# Patient Record
Sex: FEMALE | Race: WHITE | NOT HISPANIC OR LATINO
[De-identification: names, ages, dates, MRNs, and addresses within clinical notes are randomized per-mention and may not be internally consistent; named-entity substitution may affect disease eponyms.]

---

## 2017-01-17 ENCOUNTER — APPOINTMENT (OUTPATIENT)
Dept: CARDIOLOGY | Facility: CLINIC | Age: 52
End: 2017-01-17

## 2017-01-17 VITALS
SYSTOLIC BLOOD PRESSURE: 128 MMHG | RESPIRATION RATE: 16 BRPM | WEIGHT: 220 LBS | DIASTOLIC BLOOD PRESSURE: 82 MMHG | HEART RATE: 80 BPM | HEIGHT: 63 IN | BODY MASS INDEX: 38.98 KG/M2

## 2017-01-17 DIAGNOSIS — Z82.49 FAMILY HISTORY OF ISCHEMIC HEART DISEASE AND OTHER DISEASES OF THE CIRCULATORY SYSTEM: ICD-10-CM

## 2017-01-17 DIAGNOSIS — Z86.2 PERSONAL HISTORY OF DISEASES OF THE BLOOD AND BLOOD-FORMING ORGANS AND CERTAIN DISORDERS INVOLVING THE IMMUNE MECHANISM: ICD-10-CM

## 2017-01-17 RX ORDER — OMEPRAZOLE 40 MG/1
40 CAPSULE, DELAYED RELEASE ORAL DAILY
Qty: 30 | Refills: 5 | Status: ACTIVE | COMMUNITY
Start: 2017-01-17

## 2017-02-11 ENCOUNTER — APPOINTMENT (OUTPATIENT)
Dept: CARDIOLOGY | Facility: CLINIC | Age: 52
End: 2017-02-11

## 2017-02-13 ENCOUNTER — APPOINTMENT (OUTPATIENT)
Dept: CARDIOLOGY | Facility: CLINIC | Age: 52
End: 2017-02-13

## 2017-09-18 ENCOUNTER — RESULT REVIEW (OUTPATIENT)
Age: 52
End: 2017-09-18

## 2020-08-28 ENCOUNTER — APPOINTMENT (OUTPATIENT)
Dept: INTERNAL MEDICINE | Facility: CLINIC | Age: 55
End: 2020-08-28

## 2020-09-02 ENCOUNTER — APPOINTMENT (OUTPATIENT)
Dept: PULMONOLOGY | Facility: CLINIC | Age: 55
End: 2020-09-02

## 2020-09-30 ENCOUNTER — LABORATORY RESULT (OUTPATIENT)
Age: 55
End: 2020-09-30

## 2020-09-30 ENCOUNTER — APPOINTMENT (OUTPATIENT)
Dept: PULMONOLOGY | Facility: CLINIC | Age: 55
End: 2020-09-30
Payer: MEDICAID

## 2020-09-30 VITALS
BODY MASS INDEX: 41.11 KG/M2 | OXYGEN SATURATION: 97 % | HEIGHT: 63 IN | DIASTOLIC BLOOD PRESSURE: 88 MMHG | TEMPERATURE: 98.4 F | WEIGHT: 232 LBS | HEART RATE: 85 BPM | SYSTOLIC BLOOD PRESSURE: 142 MMHG

## 2020-09-30 DIAGNOSIS — F17.200 NICOTINE DEPENDENCE, UNSPECIFIED, UNCOMPLICATED: ICD-10-CM

## 2020-09-30 PROCEDURE — 99406 BEHAV CHNG SMOKING 3-10 MIN: CPT

## 2020-09-30 PROCEDURE — 36415 COLL VENOUS BLD VENIPUNCTURE: CPT

## 2020-09-30 PROCEDURE — 99204 OFFICE O/P NEW MOD 45 MIN: CPT | Mod: 25

## 2020-09-30 NOTE — COUNSELING
[Risk of tobacco use and health benefits of smoking cessation discussed] : Risk of tobacco use and health benefits of smoking cessation discussed [Use of nicotine replacement therapies and other medications discussed] : Use of nicotine replacement therapies and other medications discussed [Willing to Quit Smoking] : Willing to quit smoking [Tobacco Use Cessation Intermediate Greater Than 3 Minutes Up to 10 Minutes] : Tobacco Use Cessation Intermediate Greater Than 3 Minutes Up to 10 Minutes [FreeTextEntry2] : Pt will get otc nicotine patch 14 mg and knows she should not smoke while patch used [FreeTextEntry1] : 7

## 2020-09-30 NOTE — ASSESSMENT
[FreeTextEntry1] : \par \par VISIT DATE ISSUES COMPLAINTS EVENTS PERTINENT ROS AND PE\par \par INITIAL VISIT\par 9/30/2020 Wants to start seeing Pulm for dyspnea current smoker has gerd Saw Dr Og Wang recently Used to see Dr Grey 3 y ago but insurnace changed and not accepted in their office Has tried chantix but got nightmares  Has fam ho cad at young age in her sister She stated smoking age 9 1 ppd now 1/2 ppd \par \par \par MEDICATIONS\par ----------------------\par 9/30/2020 \par Omeprazole 40 (9/30/2020) \par FA 1 (9/30/2020) \par \par \par ALLERGY.    9/30/2020 nka \par                                          \par CURRENT SMOKER.   9/30/2020 1/2 ppd \par PAST SMOKING HX.       9/30/2020 started age 9 1 ppd \par \par DYSPNEA DATE/MMRC.   9/30/2020 1 \par COUGH.    .9/30/2020 No Gets bronchitis every year                         \par PHLEGM.  9/30/2020 scant \par     \par CLUBBING.     9/30/2020 no               .\par WHEEZE.  9/30/2020 no \par JUGULAR VENOUS DISTENTION.   9/30/2020 no                           \par PEDAL EDEMA.    9/30/2020 no   \par \par \par                                             \par BP.         \par 9/30/2020 140/80 \par HR.   \par 9/30/2020 85\par                                                    \par WT.        \par 9/30/2020 232 lb                      \par BMI.   \par 9/30/2020 41 \par    \par PULSE OX.\par 9/30/2020 ra rest 97%\par \par \par \par MONITORING. \par \par DYSPNEA. \par DATE            MMRC (M)\par 9/30/2020    1\par \par    \par DATE                 COMMENTS   \par 9/30/2020        1 I get short of breath when hurrying on level ground or walking up a slight hill \par \par \par \par \par ALLERGY. 9/30/2020 nka\par HABITS (ETOH/DRUGS).   9/30/2020 Former drug abuse crack cocaine In recovery sionce 1996\par HABITS (SMOKING). 9/30/2020 1 ppd since age 9 now 1/2 ppd \par FAMILY.   9/30/2020 Sister cad young age No cancer in family No asthma \par BIRTHPLACE.9/30/2020 [place] \par IN [place] SINCE. \par TRAVEL.     9/30/2020 No             \par IMMUNIZATION.   9/30/2020 Usually does not take flu shot                        \par PETS.     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 9/30/2020 No \par \par     \par HOME OXYGEN..  9/30/2020 No       \par HOME NIV..     9/30/2020 No \par HOME NEBUL. 9/30/2020 No \par SPACER. 9/30/2020 No \par \par \par _____________________________\par PROBLEM LIST\par _____________________________  \par \par DYSPNEA\par 9/30/2020 SOB on minor exertion and hears herself weezing Saw DR Grey 3 y ago but he does not accept her insurance \par \par CURRENT SMOKER\par 9/30/2020 Smokes 1 ppd \par 9/30/2020 Tried Chatix in past but did not like it Had nightmares\par \par NEED FOR LUNG CANCER SCREENING\par \par CAD\par Family HO premature CAD (sister)\par \par OBESITY\par 9/30/2020 She knows that she needs to stop smoking \par 9/30/2020 232 lb BMI 41 \par \par GERD\par \par ______________________________________\par ASSESSMENT/ RECOMMENDATIONS (A/R) \par PROBLEM\par _______________________________________\par \par DYSPNEA\par 9/30/2020 has ho smoking so could have copd \par 9/30/2020 Check labs incl aec ige bnp cbc sma7 d-dimr COVID \par 9/30/2020 Check covid\par 9/30/2020 Check pfts \par 9/30/2020 Check cxr\par 9/30/2020 Check v duplex \par GERD\par 9/30/2020 is on omeprazole \par OBESITY \par 9/30/2020 Advised wt loss\par SMOKER\par 9/30/2020 Counseled to stop \par 9/30/2020 Start nicotine patch 14 mg \par \par \par \par \par \par \par \par \par CAITLIN BURTON 1965   NKA Dr Dasilva 54 f \par \par

## 2020-09-30 NOTE — PHYSICAL EXAM
[No Acute Distress] : no acute distress [Normal Oropharynx] : normal oropharynx [Normal Appearance] : normal appearance [No Neck Mass] : no neck mass [Normal Rate/Rhythm] : normal rate/rhythm [Normal S1, S2] : normal s1, s2 [No Murmurs] : no murmurs [No Resp Distress] : no resp distress [Clear to Auscultation Bilaterally] : clear to auscultation bilaterally [No Abnormalities] : no abnormalities [Benign] : benign [Normal Gait] : normal gait [No Clubbing] : no clubbing [No Cyanosis] : no cyanosis [No Edema] : no edema [FROM] : FROM [Normal Color/ Pigmentation] : normal color/ pigmentation [No Focal Deficits] : no focal deficits [Oriented x3] : oriented x3 [Normal Affect] : normal affect [TextBox_2] : obese

## 2020-09-30 NOTE — CONSULT LETTER
[Dear  ___] : Dear  [unfilled], [Consult Letter:] : I had the pleasure of evaluating your patient, [unfilled]. [Please see my note below.] : Please see my note below. [Consult Closing:] : Thank you very much for allowing me to participate in the care of this patient.  If you have any questions, please do not hesitate to contact me. [Sincerely,] : Sincerely, [FreeTextEntry3] : Yours truly,\par \par Lenin Meneses MD\par

## 2020-10-06 LAB
A1AT SERPL-MCNC: 144 MG/DL
ALBUMIN SERPL ELPH-MCNC: 4.2 G/DL
ALP BLD-CCNC: 100 U/L
ALT SERPL-CCNC: 30 U/L
ANION GAP SERPL CALC-SCNC: 16 MMOL/L
AST SERPL-CCNC: 22 U/L
BASOPHILS # BLD AUTO: 0.16 K/UL
BASOPHILS NFR BLD AUTO: 1.7 %
BILIRUB SERPL-MCNC: 0.8 MG/DL
BUN SERPL-MCNC: 12 MG/DL
CALCIUM SERPL-MCNC: 9.2 MG/DL
CHLORIDE SERPL-SCNC: 104 MMOL/L
CO2 SERPL-SCNC: 22 MMOL/L
CREAT SERPL-MCNC: 0.66 MG/DL
DEPRECATED D DIMER PPP IA-ACNC: <150 NG/ML DDU
EOSINOPHIL # BLD AUTO: 0.08 K/UL
EOSINOPHIL # BLD MANUAL: 180 /UL
EOSINOPHIL NFR BLD AUTO: 0.9 %
GLUCOSE SERPL-MCNC: 89 MG/DL
HCT VFR BLD CALC: 39.4 %
HGB BLD-MCNC: 11.6 G/DL
LYMPHOCYTES # BLD AUTO: 3.95 K/UL
LYMPHOCYTES NFR BLD AUTO: 42.1 %
MAN DIFF?: NORMAL
MCHC RBC-ENTMCNC: 19.5 PG
MCHC RBC-ENTMCNC: 29.4 GM/DL
MCV RBC AUTO: 66.1 FL
MONOCYTES # BLD AUTO: 0.24 K/UL
MONOCYTES NFR BLD AUTO: 2.6 %
NEUTROPHILS # BLD AUTO: 4.77 K/UL
NEUTROPHILS NFR BLD AUTO: 50.9 %
NT-PROBNP SERPL-MCNC: 137 PG/ML
PLATELET # BLD AUTO: 192 K/UL
POTASSIUM SERPL-SCNC: 4.3 MMOL/L
PROT SERPL-MCNC: 6.6 G/DL
RBC # BLD: 5.96 M/UL
RBC # FLD: 17.9 %
SARS-COV-2 IGG SERPL IA-ACNC: <0.1 INDEX
SARS-COV-2 IGG SERPL QL IA: NEGATIVE
SODIUM SERPL-SCNC: 141 MMOL/L
TOTAL IGE SMQN RAST: 9 KU/L
WBC # FLD AUTO: 9.38 K/UL

## 2020-10-13 ENCOUNTER — OUTPATIENT (OUTPATIENT)
Dept: OUTPATIENT SERVICES | Facility: HOSPITAL | Age: 55
LOS: 1 days | End: 2020-10-13
Payer: MEDICAID

## 2020-10-13 ENCOUNTER — APPOINTMENT (OUTPATIENT)
Dept: ULTRASOUND IMAGING | Facility: CLINIC | Age: 55
End: 2020-10-13
Payer: MEDICAID

## 2020-10-13 ENCOUNTER — APPOINTMENT (OUTPATIENT)
Dept: RADIOLOGY | Facility: CLINIC | Age: 55
End: 2020-10-13
Payer: MEDICAID

## 2020-10-13 DIAGNOSIS — F17.210 NICOTINE DEPENDENCE, CIGARETTES, UNCOMPLICATED: ICD-10-CM

## 2020-10-13 DIAGNOSIS — Z00.8 ENCOUNTER FOR OTHER GENERAL EXAMINATION: ICD-10-CM

## 2020-10-13 DIAGNOSIS — K21.9 GASTRO-ESOPHAGEAL REFLUX DISEASE WITHOUT ESOPHAGITIS: ICD-10-CM

## 2020-10-13 PROCEDURE — 71046 X-RAY EXAM CHEST 2 VIEWS: CPT

## 2020-10-13 PROCEDURE — 71046 X-RAY EXAM CHEST 2 VIEWS: CPT | Mod: 26

## 2020-10-13 PROCEDURE — 93970 EXTREMITY STUDY: CPT | Mod: 26

## 2020-10-13 PROCEDURE — 93970 EXTREMITY STUDY: CPT

## 2020-10-14 DIAGNOSIS — Z01.818 ENCOUNTER FOR OTHER PREPROCEDURAL EXAMINATION: ICD-10-CM

## 2020-10-15 ENCOUNTER — APPOINTMENT (OUTPATIENT)
Dept: DISASTER EMERGENCY | Facility: CLINIC | Age: 55
End: 2020-10-15

## 2020-10-16 LAB — SARS-COV-2 N GENE NPH QL NAA+PROBE: NOT DETECTED

## 2020-10-19 ENCOUNTER — APPOINTMENT (OUTPATIENT)
Dept: PULMONOLOGY | Facility: CLINIC | Age: 55
End: 2020-10-19
Payer: MEDICAID

## 2020-10-19 PROCEDURE — 99072 ADDL SUPL MATRL&STAF TM PHE: CPT

## 2020-10-19 PROCEDURE — 94729 DIFFUSING CAPACITY: CPT

## 2020-10-19 PROCEDURE — 94010 BREATHING CAPACITY TEST: CPT

## 2020-10-19 PROCEDURE — 94727 GAS DIL/WSHOT DETER LNG VOL: CPT

## 2020-10-21 ENCOUNTER — APPOINTMENT (OUTPATIENT)
Dept: PULMONOLOGY | Facility: CLINIC | Age: 55
End: 2020-10-21
Payer: MEDICAID

## 2020-10-21 VITALS
TEMPERATURE: 98.2 F | DIASTOLIC BLOOD PRESSURE: 85 MMHG | BODY MASS INDEX: 40.75 KG/M2 | SYSTOLIC BLOOD PRESSURE: 131 MMHG | OXYGEN SATURATION: 98 % | HEIGHT: 63 IN | WEIGHT: 230 LBS | HEART RATE: 80 BPM | RESPIRATION RATE: 14 BRPM

## 2020-10-21 PROCEDURE — 99406 BEHAV CHNG SMOKING 3-10 MIN: CPT

## 2020-10-21 PROCEDURE — 99072 ADDL SUPL MATRL&STAF TM PHE: CPT

## 2020-10-21 PROCEDURE — 99214 OFFICE O/P EST MOD 30 MIN: CPT | Mod: 25

## 2020-10-21 NOTE — COUNSELING
[Risk of tobacco use and health benefits of smoking cessation discussed] : Risk of tobacco use and health benefits of smoking cessation discussed [Cessation strategies including cessation program discussed] : Cessation strategies including cessation program discussed [Use of nicotine replacement therapies and other medications discussed] : Use of nicotine replacement therapies and other medications discussed [Willing to Quit Smoking] : Willing to quit smoking [Tobacco Use Cessation Intermediate Greater Than 3 Minutes Up to 10 Minutes] : Tobacco Use Cessation Intermediate Greater Than 3 Minutes Up to 10 Minutes [FreeTextEntry1] : 7
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
## 2020-12-02 ENCOUNTER — APPOINTMENT (OUTPATIENT)
Dept: PULMONOLOGY | Facility: CLINIC | Age: 55
End: 2020-12-02

## 2020-12-09 ENCOUNTER — APPOINTMENT (OUTPATIENT)
Dept: PULMONOLOGY | Facility: CLINIC | Age: 55
End: 2020-12-09
Payer: MEDICAID

## 2020-12-09 VITALS
HEART RATE: 82 BPM | DIASTOLIC BLOOD PRESSURE: 75 MMHG | TEMPERATURE: 98.1 F | WEIGHT: 233 LBS | RESPIRATION RATE: 14 BRPM | BODY MASS INDEX: 41.29 KG/M2 | HEIGHT: 63 IN | OXYGEN SATURATION: 96 % | SYSTOLIC BLOOD PRESSURE: 117 MMHG

## 2020-12-09 DIAGNOSIS — Z12.2 ENCOUNTER FOR SCREENING FOR MALIGNANT NEOPLASM OF RESPIRATORY ORGANS: ICD-10-CM

## 2020-12-09 PROCEDURE — 99406 BEHAV CHNG SMOKING 3-10 MIN: CPT

## 2020-12-09 PROCEDURE — 99214 OFFICE O/P EST MOD 30 MIN: CPT | Mod: 25

## 2020-12-09 PROCEDURE — 99072 ADDL SUPL MATRL&STAF TM PHE: CPT

## 2020-12-09 NOTE — ASSESSMENT
[FreeTextEntry1] : \par \par \par \par \par _________________________________\par VISIT DATE ISSUES COMPLAINTS EVENTS PERTINENT ROS AND PE\par ______________________________\par \par 2020 Contines to smoke Counseled again for over 7 min risks rewards discussed \par 2020 Could not get spiriva Nurse checked Insurance will okay Incruse  which was ordered\par 2020 Agrees to ldct meets criteria  referred  Shared decision making done \par \par \par _____________________________\par PROBLEM LIST\par _____________________________ .  \par \par DYSPNEA 2020 SOB on minor exertion \par SMOKER 2020 Smokes 1 ppd \par OBESITY 2020 232 lb BMI 41 \par NEED FOR LUNG CANCER SCREENING 54 f smoker\par CAD\par FAMILY HISTORY OF PREMATURE CAD (Sister)\par GERD\par \par \par RECOMMENDATIONS. \par COPD \par 2020 Insurance does not cover spiriva Covers incruse Will order \par NEED FOR HOME OXYGEN \par 2020 RA PO 96% Does not need at this point \par SCREENING FOR LUNG CA \par 2020 Refer ct ldct \par SMOKING CESSATION \par 2020 Still smokes 1/2 ppd Counseled again for 7 min to stop Explained risks rewards \par 2020 RTC 3 w \par FU \par 2020 3 wk\par \par \par \par \par \par \par \par ___________________\par PATIENT NAME KAITLIN BURTON 1965\par PATIENT INSTRUCTIONS \par Copy was handed to patient at end of visit\par DATE OF VISIT  2020\par _______________________________\par \par 1) Come back to see Dr Meneses 3 WEEKS \par 2) Get LOW DOSE SCREENING CT CHEST AT PLACE  TELLS YOU\par 3) START INCRUSE 1 PUFF A DAY INSTEAD OF SPIRIVA \par 4) stop smoking\par \par

## 2020-12-10 ENCOUNTER — TRANSCRIPTION ENCOUNTER (OUTPATIENT)
Age: 55
End: 2020-12-10

## 2021-01-06 ENCOUNTER — APPOINTMENT (OUTPATIENT)
Dept: PULMONOLOGY | Facility: CLINIC | Age: 56
End: 2021-01-06
Payer: MEDICAID

## 2021-01-06 VITALS
OXYGEN SATURATION: 96 % | SYSTOLIC BLOOD PRESSURE: 123 MMHG | DIASTOLIC BLOOD PRESSURE: 86 MMHG | HEIGHT: 63 IN | WEIGHT: 235 LBS | HEART RATE: 79 BPM | TEMPERATURE: 97.6 F | BODY MASS INDEX: 41.64 KG/M2 | RESPIRATION RATE: 14 BRPM

## 2021-01-06 DIAGNOSIS — R06.00 DYSPNEA, UNSPECIFIED: ICD-10-CM

## 2021-01-06 DIAGNOSIS — Z23 ENCOUNTER FOR IMMUNIZATION: ICD-10-CM

## 2021-01-06 DIAGNOSIS — J44.9 CHRONIC OBSTRUCTIVE PULMONARY DISEASE, UNSPECIFIED: ICD-10-CM

## 2021-01-06 DIAGNOSIS — J84.115 RESPIRATORY BRONCHIOLITIS INTERSTITIAL LUNG DISEASE: ICD-10-CM

## 2021-01-06 PROCEDURE — 90686 IIV4 VACC NO PRSV 0.5 ML IM: CPT

## 2021-01-06 PROCEDURE — 90471 IMMUNIZATION ADMIN: CPT

## 2021-01-06 PROCEDURE — 99072 ADDL SUPL MATRL&STAF TM PHE: CPT

## 2021-01-06 PROCEDURE — 90732 PPSV23 VACC 2 YRS+ SUBQ/IM: CPT

## 2021-01-06 PROCEDURE — 99214 OFFICE O/P EST MOD 30 MIN: CPT | Mod: 25

## 2021-01-06 PROCEDURE — 90472 IMMUNIZATION ADMIN EACH ADD: CPT

## 2021-01-06 PROCEDURE — 99406 BEHAV CHNG SMOKING 3-10 MIN: CPT | Mod: 25

## 2021-01-06 NOTE — ASSESSMENT
[FreeTextEntry1] : \par \par \par ___________________\par RBILD\par ___________________\par HISTORY\par 2021 54 f Current smoker\par CT \par LDCT [facility] \par 2020 \par Findings cw RBILD No susp nodules \par A/R\par 2020 Called spoke to patient Strongly counseled to stop smoking immediately and to make sure to see me at scheduled appt\par \par 2020 Message left on patient's phone to call me so I can dw her re RBILD \par \par \par ___________________\par SMOKING CESSATION \par ___________________\par HISTORY\par 2020 Smokes 1 ppd \par 2020 Tried Chatix in past but did not like it Had nightmares\par quit 2021\par CURRENT SMOKING STATUS\par 2021 stopped smoking 2021\par 2020 Smokes 1 ppd \par PHARMACOLOGIC AIDS\par 2021 States she has tried nicotine patch chantix (gave her nightmares) \par 2021 Declines NRT Bupropion \par A/R \par 2021 Stopped smoking New Years jose\par 2021 Reinforced to not resume smoking\par 2021 Explained risks and rewards of smoking and cessation resp Spent 7 min\par \par 2020 Still smokes 1/2 ppd Counseled again for 7 min to stop Explained risks rewards \par 2020 RTC 3 w \par FU \par 2020 3 wk\par \par \par \par __________________\par ASSESSMENT/RECOMMENDATIONS. \par _____________________________\par \par FOLLOWUP. 2021 RTC 2 m \par \par DYSPNEA. 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 \par 2021 ra rest po 96% Does not require home O2 \par \par COPD . \par 2021 Has incruse feels it helps \par \par OBESITY. \par 2021 Is planning to dw PMD re bariatric surgery \par \par SMOKING CESSATION. \par 2021 Stopped smoking New Years jose\par 2021 Reinforced to not resume smoking\par 2021 Explained risks and rewards of smoking and cessation resp Spent 7 min\par \par LUNG CANCER SCREEN. \par LDCT done 2020 Recommended annual ct screening \par 2021 Explained to pt \par \par RBILD \par 2020 CT chest [facility] showed resp bronchiolitis interstitial lung disease which is caused by smoking \par 2021 Pt explained that it is very important that she does not restart smoking\par 2021 Pt agrees to not smoke Will plan HRCT in 2021 \par \par IMMUNIZATION\par 2021 Has never has pneumonia or flu shot \par 2021 refer for ppsv23 as she has chr lung disease so qualifies for shot even though she is below 65\par 2021 refer for flu shot \par ______________\par CAITLIN BURTON 1965  [phone] GILMA Dasilva 54 f \par \par \par \par \par PATIENT NAME  CAITLIN BURTON 1965\par PATIENT INSTRUCTIONS \par Copy was handed to patient at end of visit\par DATE OF VISIT  2021\par _______________________________\par \par 1) Come back to see Dr Meneses 2 M\par 2) STOP SMOKING YOU HAVE A LUNG DISEASE CALLED RBILD WHICH IS CUASED BY SMOKING AND WE WILL PLAN CT CHEST IN 2 M ONVCE YOU STOP SMOKING \par 3) I ORDERED FLU SHOT AND PNEUMONIA SHOT TODAY  PLEASE TAKE BOTH BEFORE YOU LEAVE (PPSV23)(FLUARIX)

## 2021-02-21 ENCOUNTER — TRANSCRIPTION ENCOUNTER (OUTPATIENT)
Age: 56
End: 2021-02-21

## 2021-04-14 ENCOUNTER — APPOINTMENT (OUTPATIENT)
Dept: PULMONOLOGY | Facility: CLINIC | Age: 56
End: 2021-04-14

## 2021-09-09 ENCOUNTER — NON-APPOINTMENT (OUTPATIENT)
Age: 56
End: 2021-09-09

## 2021-09-09 ENCOUNTER — APPOINTMENT (OUTPATIENT)
Dept: GASTROENTEROLOGY | Facility: CLINIC | Age: 56
End: 2021-09-09
Payer: MEDICAID

## 2021-09-09 VITALS
HEIGHT: 63 IN | SYSTOLIC BLOOD PRESSURE: 125 MMHG | BODY MASS INDEX: 40.93 KG/M2 | HEART RATE: 85 BPM | DIASTOLIC BLOOD PRESSURE: 87 MMHG | WEIGHT: 231 LBS

## 2021-09-09 DIAGNOSIS — K21.9 GASTRO-ESOPHAGEAL REFLUX DISEASE W/OUT ESOPHAGITIS: ICD-10-CM

## 2021-09-09 DIAGNOSIS — F17.210 NICOTINE DEPENDENCE, CIGARETTES, UNCOMPLICATED: ICD-10-CM

## 2021-09-09 DIAGNOSIS — Z12.11 ENCOUNTER FOR SCREENING FOR MALIGNANT NEOPLASM OF COLON: ICD-10-CM

## 2021-09-09 DIAGNOSIS — E66.9 OBESITY, UNSPECIFIED: ICD-10-CM

## 2021-09-09 PROCEDURE — 99204 OFFICE O/P NEW MOD 45 MIN: CPT

## 2021-09-09 RX ORDER — UMECLIDINIUM 62.5 UG/1
62.5 AEROSOL, POWDER ORAL DAILY
Qty: 30 | Refills: 4 | Status: DISCONTINUED | COMMUNITY
Start: 2020-12-09 | End: 2021-09-09

## 2021-09-09 RX ORDER — FOLIC ACID 1 MG/1
1 TABLET ORAL DAILY
Qty: 30 | Refills: 5 | Status: DISCONTINUED | COMMUNITY
Start: 2017-01-17 | End: 2021-09-09

## 2021-09-09 RX ORDER — TIOTROPIUM BROMIDE 18 UG/1
18 CAPSULE ORAL; RESPIRATORY (INHALATION) DAILY
Qty: 30 | Refills: 4 | Status: DISCONTINUED | COMMUNITY
Start: 2020-10-21 | End: 2021-09-09

## 2021-09-10 PROBLEM — E66.9 OBESITY: Status: ACTIVE | Noted: 2017-01-17

## 2021-09-10 PROBLEM — K21.9 GERD (GASTROESOPHAGEAL REFLUX DISEASE): Status: ACTIVE | Noted: 2017-01-17

## 2021-09-10 PROBLEM — Z12.11 ENCOUNTER FOR SCREENING COLONOSCOPY: Status: RESOLVED | Noted: 2021-09-10 | Resolved: 2021-09-24

## 2021-09-10 PROBLEM — F17.210 CIGARETTE SMOKER: Status: ACTIVE | Noted: 2020-09-30

## 2021-09-10 NOTE — ASSESSMENT
[FreeTextEntry1] : 1.  Encounter for colon cancer screening in average risk patient.  No previous colonoscopy.\par 2.  Chronic GERD, stable on PPI.  Rule out Haque's esophagus.\par 3.  Morbid obesity.\par 4.  Asthma/ COPD.\par 5.  Smoker.\par \par Recs:\par - Recent labs reviewed.\par - Patient was counseled on GERD lifestyle modifications including head of bed elevation at night, avoiding lying down 2-3 hours after eating, weight loss, smoking cessation, avoiding tight-fitting clothing, eating small, frequent meals, and minimizing potential food triggers (tomatoes/sauce, caffeine, alcohol, spicy foods, citrus foods, red meat, chocolate, mint, carbonated beverages).\par - Patient was advised on weight loss and smoking cessation.\par - Patient was advised to undergo endoscopy and colonoscopy in the hospital setting- procedure, risks, benefits, and alternatives were explained. Patient agreeable. Brochure given. [employer] prep. (She has prep from her friend Cherie Woo).

## 2021-09-10 NOTE — HISTORY OF PRESENT ILLNESS
[Heartburn] : stable heartburn [Nausea] : denies nausea [Vomiting] : denies vomiting [Diarrhea] : denies diarrhea [Constipation] : denies constipation [Yellow Skin Or Eyes (Jaundice)] : denies jaundice [Abdominal Pain] : denies abdominal pain [Abdominal Swelling] : denies abdominal swelling [Rectal Pain] : denies rectal pain [de-identified] : Stephanie presents to the office today for evaluation for colon cancer screening.  She also suffers from GERD.\par \par The patient reports a history of chronic reflux for more than 10 years and had been taking omeprazole 20 mg for many years which was increased to 40 mg BID a few years ago due to worsening reflux.  She sometimes feels as if food sits in her epigastric region.  She  moves her bowels daily and rarely sees red blood coating the stool if she passes a hard stool.  She denies any NSAIDs or FH of GI malignancies.  She has never had a colonoscopy but had a prior EGD more than 10 years ago.  She has gained weight over the past few years.

## 2021-10-30 ENCOUNTER — TRANSCRIPTION ENCOUNTER (OUTPATIENT)
Age: 56
End: 2021-10-30

## 2021-11-12 ENCOUNTER — APPOINTMENT (OUTPATIENT)
Dept: DISASTER EMERGENCY | Facility: CLINIC | Age: 56
End: 2021-11-12

## 2021-12-10 NOTE — ASU PATIENT PROFILE, ADULT - FALL HARM RISK - UNIVERSAL INTERVENTIONS
Bed in lowest position, wheels locked, appropriate side rails in place/Call bell, personal items and telephone in reach/Instruct patient to call for assistance before getting out of bed or chair/Non-slip footwear when patient is out of bed/[place] to call system/Physically safe environment - no spills, clutter or unnecessary equipment/Purposeful Proactive Rounding/Room/bathroom lighting operational, light cord in reach

## 2021-12-13 ENCOUNTER — APPOINTMENT (OUTPATIENT)
Dept: GASTROENTEROLOGY | Facility: HOSPITAL | Age: 56
End: 2021-12-13

## 2022-01-10 ENCOUNTER — RESULT REVIEW (OUTPATIENT)
Age: 57
End: 2022-01-10

## 2022-01-10 ENCOUNTER — OUTPATIENT (OUTPATIENT)
Dept: OUTPATIENT SERVICES | Facility: HOSPITAL | Age: 57
LOS: 1 days | Discharge: HOME | End: 2022-01-10
Payer: MEDICAID

## 2022-01-10 ENCOUNTER — APPOINTMENT (OUTPATIENT)
Dept: GASTROENTEROLOGY | Facility: HOSPITAL | Age: 57
End: 2022-01-10

## 2022-01-10 VITALS
RESPIRATION RATE: 20 BRPM | WEIGHT: 229.94 LBS | TEMPERATURE: 98 F | SYSTOLIC BLOOD PRESSURE: 137 MMHG | DIASTOLIC BLOOD PRESSURE: 82 MMHG | HEART RATE: 72 BPM | HEIGHT: 69 IN | OXYGEN SATURATION: 97 %

## 2022-01-10 VITALS
HEART RATE: 68 BPM | OXYGEN SATURATION: 100 % | RESPIRATION RATE: 18 BRPM | DIASTOLIC BLOOD PRESSURE: 81 MMHG | SYSTOLIC BLOOD PRESSURE: 115 MMHG

## 2022-01-10 DIAGNOSIS — K21.9 GASTRO-ESOPHAGEAL REFLUX DISEASE WITHOUT ESOPHAGITIS: ICD-10-CM

## 2022-01-10 PROCEDURE — 44394 COLONOSCOPY W/SNARE: CPT | Mod: 33

## 2022-01-10 PROCEDURE — 88305 TISSUE EXAM BY PATHOLOGIST: CPT | Mod: 26

## 2022-01-10 PROCEDURE — 45380 COLONOSCOPY AND BIOPSY: CPT | Mod: 59,33

## 2022-01-10 PROCEDURE — 43239 EGD BIOPSY SINGLE/MULTIPLE: CPT | Mod: 59

## 2022-10-04 PROBLEM — Z78.9 OTHER SPECIFIED HEALTH STATUS: Chronic | Status: ACTIVE | Noted: 2022-01-10

## 2022-10-12 ENCOUNTER — APPOINTMENT (OUTPATIENT)
Dept: ORTHOPEDIC SURGERY | Facility: CLINIC | Age: 57
End: 2022-10-12

## 2022-10-12 DIAGNOSIS — S62.002A UNSPECIFIED FRACTURE OF NAVICULAR [SCAPHOID] BONE OF LEFT WRIST, INITIAL ENCOUNTER FOR CLOSED FRACTURE: ICD-10-CM

## 2022-10-12 DIAGNOSIS — M25.532 PAIN IN LEFT WRIST: ICD-10-CM

## 2022-10-12 PROCEDURE — 99203 OFFICE O/P NEW LOW 30 MIN: CPT | Mod: 57

## 2022-10-12 PROCEDURE — 25622 CLTX CARPL SCPHD FX W/O MNPJ: CPT | Mod: LT

## 2022-10-12 PROCEDURE — 99072 ADDL SUPL MATRL&STAF TM PHE: CPT

## 2022-10-12 PROCEDURE — 73110 X-RAY EXAM OF WRIST: CPT | Mod: LT
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
## 2022-10-13 NOTE — PHYSICAL EXAM
[de-identified] : \par L scaphoid fx\par  [de-identified] : \par The following radiographs were ordered and read by me during this patient's visit. I reviewed each radiograph in detail with the patient and discussed the findings as highlighted below. \par \par 3 views of the left wrist were obtained today that show a minimally displaced distal pole scaphoid fracture.  There is no malalignment. There is no joint dislocation, or degenerative changes seen. No other obvious osseous abnormality. Otherwise unremarkable.

## 2022-11-02 ENCOUNTER — APPOINTMENT (OUTPATIENT)
Dept: ORTHOPEDIC SURGERY | Facility: CLINIC | Age: 57
End: 2022-11-02

## 2022-12-05 ENCOUNTER — NON-APPOINTMENT (OUTPATIENT)
Age: 57
End: 2022-12-05

## 2023-10-19 ENCOUNTER — APPOINTMENT (OUTPATIENT)
Dept: ORTHOPEDIC SURGERY | Facility: CLINIC | Age: 58
End: 2023-10-19
Payer: MEDICAID

## 2023-10-19 VITALS
WEIGHT: 231 LBS | HEART RATE: 97 BPM | HEIGHT: 63 IN | SYSTOLIC BLOOD PRESSURE: 135 MMHG | OXYGEN SATURATION: 98 % | DIASTOLIC BLOOD PRESSURE: 84 MMHG | BODY MASS INDEX: 40.93 KG/M2

## 2023-10-19 DIAGNOSIS — S92.415A NONDISPLACED FRACTURE OF PROXIMAL PHALANX OF LEFT GREAT TOE, INITIAL ENCOUNTER FOR CLOSED FRACTURE: ICD-10-CM

## 2023-10-19 PROCEDURE — 99203 OFFICE O/P NEW LOW 30 MIN: CPT

## 2023-11-13 ENCOUNTER — APPOINTMENT (OUTPATIENT)
Dept: ORTHOPEDIC SURGERY | Facility: CLINIC | Age: 58
End: 2023-11-13

## 2024-02-28 ENCOUNTER — NON-APPOINTMENT (OUTPATIENT)
Age: 59
End: 2024-02-28

## 2024-04-04 ENCOUNTER — NON-APPOINTMENT (OUTPATIENT)
Age: 59
End: 2024-04-04

## 2024-10-08 ENCOUNTER — NON-APPOINTMENT (OUTPATIENT)
Age: 59
End: 2024-10-08

## 2024-11-12 ENCOUNTER — NON-APPOINTMENT (OUTPATIENT)
Age: 59
End: 2024-11-12

## 2024-11-12 ENCOUNTER — APPOINTMENT (OUTPATIENT)
Dept: CARDIOLOGY | Facility: CLINIC | Age: 59
End: 2024-11-12
Payer: MEDICAID

## 2024-11-12 DIAGNOSIS — Z83.3 FAMILY HISTORY OF DIABETES MELLITUS: ICD-10-CM

## 2024-11-12 DIAGNOSIS — R06.09 OTHER FORMS OF DYSPNEA: ICD-10-CM

## 2024-11-12 DIAGNOSIS — Z82.49 FAMILY HISTORY OF ISCHEMIC HEART DISEASE AND OTHER DISEASES OF THE CIRCULATORY SYSTEM: ICD-10-CM

## 2024-11-12 PROCEDURE — 99204 OFFICE O/P NEW MOD 45 MIN: CPT

## 2024-11-12 PROCEDURE — 93000 ELECTROCARDIOGRAM COMPLETE: CPT

## 2024-11-12 PROCEDURE — G2211 COMPLEX E/M VISIT ADD ON: CPT | Mod: NC

## 2024-11-12 RX ORDER — FAMOTIDINE 10 MG/1
10 TABLET, FILM COATED ORAL
Refills: 0 | Status: ACTIVE | COMMUNITY

## 2024-12-11 ENCOUNTER — NON-APPOINTMENT (OUTPATIENT)
Age: 59
End: 2024-12-11

## 2025-01-07 ENCOUNTER — APPOINTMENT (OUTPATIENT)
Dept: CARDIOLOGY | Facility: CLINIC | Age: 60
End: 2025-01-07
Payer: MEDICAID

## 2025-01-07 PROCEDURE — 93015 CV STRESS TEST SUPVJ I&R: CPT

## 2025-01-07 PROCEDURE — 93306 TTE W/DOPPLER COMPLETE: CPT

## 2025-01-07 RX ORDER — PERFLUTREN 6.52 MG/ML
6.52 INJECTION, SUSPENSION INTRAVENOUS
Qty: 2 | Refills: 0 | Status: COMPLETED | OUTPATIENT
Start: 2025-01-07

## 2025-01-07 RX ADMIN — PERFLUTREN MG/ML: 6.52 INJECTION, SUSPENSION INTRAVENOUS at 00:00

## 2025-01-10 DIAGNOSIS — R06.09 OTHER FORMS OF DYSPNEA: ICD-10-CM

## 2025-02-18 ENCOUNTER — APPOINTMENT (OUTPATIENT)
Dept: CT IMAGING | Facility: CLINIC | Age: 60
End: 2025-02-18
Payer: MEDICAID

## 2025-02-18 PROCEDURE — 75574 CT ANGIO HRT W/3D IMAGE: CPT

## 2025-02-21 DIAGNOSIS — R93.1 ABNORMAL FINDINGS ON DIAGNOSTIC IMAGING OF HEART AND CORONARY CIRCULATION: ICD-10-CM

## 2025-02-21 RX ORDER — ATORVASTATIN CALCIUM 20 MG/1
20 TABLET, FILM COATED ORAL
Qty: 90 | Refills: 3 | Status: ACTIVE | COMMUNITY
Start: 2025-02-21 | End: 1900-01-01

## 2025-08-26 ENCOUNTER — APPOINTMENT (OUTPATIENT)
Dept: CARDIOLOGY | Facility: CLINIC | Age: 60
End: 2025-08-26
Payer: MEDICAID

## 2025-08-26 ENCOUNTER — NON-APPOINTMENT (OUTPATIENT)
Age: 60
End: 2025-08-26

## 2025-08-26 VITALS
WEIGHT: 220 LBS | DIASTOLIC BLOOD PRESSURE: 89 MMHG | HEART RATE: 95 BPM | HEIGHT: 63 IN | SYSTOLIC BLOOD PRESSURE: 135 MMHG | BODY MASS INDEX: 38.98 KG/M2 | OXYGEN SATURATION: 95 %

## 2025-08-26 VITALS — SYSTOLIC BLOOD PRESSURE: 125 MMHG | DIASTOLIC BLOOD PRESSURE: 70 MMHG

## 2025-08-26 DIAGNOSIS — R93.1 ABNORMAL FINDINGS ON DIAGNOSTIC IMAGING OF HEART AND CORONARY CIRCULATION: ICD-10-CM

## 2025-08-26 PROCEDURE — 93000 ELECTROCARDIOGRAM COMPLETE: CPT

## 2025-08-26 PROCEDURE — 99214 OFFICE O/P EST MOD 30 MIN: CPT | Mod: 25

## 2025-09-11 ENCOUNTER — APPOINTMENT (OUTPATIENT)
Dept: PULMONOLOGY | Facility: CLINIC | Age: 60
End: 2025-09-11
Payer: MEDICAID

## 2025-09-11 VITALS
HEIGHT: 63 IN | HEART RATE: 83 BPM | SYSTOLIC BLOOD PRESSURE: 132 MMHG | OXYGEN SATURATION: 97 % | DIASTOLIC BLOOD PRESSURE: 85 MMHG | WEIGHT: 226 LBS | BODY MASS INDEX: 40.04 KG/M2

## 2025-09-11 DIAGNOSIS — F17.210 NICOTINE DEPENDENCE, CIGARETTES, UNCOMPLICATED: ICD-10-CM

## 2025-09-11 DIAGNOSIS — J44.89 OTHER SPECIFIED CHRONIC OBSTRUCTIVE PULMONARY DISEASE: ICD-10-CM

## 2025-09-11 PROCEDURE — 99406 BEHAV CHNG SMOKING 3-10 MIN: CPT

## 2025-09-11 PROCEDURE — 94010 BREATHING CAPACITY TEST: CPT

## 2025-09-11 PROCEDURE — G0296 VISIT TO DETERM LDCT ELIG: CPT

## 2025-09-11 PROCEDURE — ZZZZZ: CPT

## 2025-09-11 PROCEDURE — 94727 GAS DIL/WSHOT DETER LNG VOL: CPT

## 2025-09-11 PROCEDURE — 94729 DIFFUSING CAPACITY: CPT

## 2025-09-11 PROCEDURE — 99204 OFFICE O/P NEW MOD 45 MIN: CPT | Mod: 25

## (undated) DEVICE — SNARE EXACTO COLD 9MMX230CM

## (undated) DEVICE — CATH IV SAFE BC 22G X 1" (BLUE)

## (undated) DEVICE — PACK IV START WITH CHG

## (undated) DEVICE — CONTAINER FORMALIN 80ML YELLOW

## (undated) DEVICE — BIOPSY FORCEP RADIAL JAW 4 STANDARD WITH NEEDLE

## (undated) DEVICE — GOWN LG

## (undated) DEVICE — SALIVA EJECTOR (BLUE)

## (undated) DEVICE — ELCTR GROUNDING PAD ADULT COVIDIEN

## (undated) DEVICE — TRAP QUICK CATCH  SINGL CHAMBER

## (undated) DEVICE — LINE BREATHE SAMPLNG

## (undated) DEVICE — LUBRICATING JELLY HR ONE SHOT 3G

## (undated) DEVICE — BASIN EMESIS 10IN GRADUATED MAUVE

## (undated) DEVICE — DRSG CURITY GAUZE SPONGE 4 X 4" 12-PLY NON-STERILE

## (undated) DEVICE — DRSG 2X2

## (undated) DEVICE — TUBING MEDI-VAC W MAXIGRIP CONNECTORS 1/4"X6'

## (undated) DEVICE — ELCTR ECG CONDUCTIVE ADHESIVE

## (undated) DEVICE — DRSG BANDAID 0.75X3"

## (undated) DEVICE — BIOPSY FORCEP COLD DISP

## (undated) DEVICE — TUBING IV SET GRAVITY 3Y 100" MACRO

## (undated) DEVICE — TUBING SUCTION NONCONDUCTIVE 6MM X 12FT

## (undated) DEVICE — FACESHIELD FULL VISOR